# Patient Record
Sex: FEMALE | Race: WHITE | NOT HISPANIC OR LATINO | Employment: UNEMPLOYED | URBAN - METROPOLITAN AREA
[De-identification: names, ages, dates, MRNs, and addresses within clinical notes are randomized per-mention and may not be internally consistent; named-entity substitution may affect disease eponyms.]

---

## 2022-05-09 ENCOUNTER — HOSPITAL ENCOUNTER (OUTPATIENT)
Facility: MEDICAL CENTER | Age: 42
End: 2022-05-09
Attending: SPECIALIST
Payer: MEDICAID

## 2022-05-09 PROCEDURE — 87491 CHLMYD TRACH DNA AMP PROBE: CPT

## 2022-05-09 PROCEDURE — 87591 N.GONORRHOEAE DNA AMP PROB: CPT

## 2022-05-09 PROCEDURE — 87624 HPV HI-RISK TYP POOLED RSLT: CPT

## 2022-05-09 PROCEDURE — 88175 CYTOPATH C/V AUTO FLUID REDO: CPT

## 2022-05-11 LAB
C TRACH DNA GENITAL QL NAA+PROBE: NEGATIVE
CYTOLOGY REG CYTOL: NORMAL
HPV HR 12 DNA CVX QL NAA+PROBE: NEGATIVE
HPV16 DNA SPEC QL NAA+PROBE: NEGATIVE
HPV18 DNA SPEC QL NAA+PROBE: NEGATIVE
N GONORRHOEA DNA GENITAL QL NAA+PROBE: NEGATIVE
SPECIMEN SOURCE: NORMAL
SPECIMEN SOURCE: NORMAL

## 2022-05-12 ENCOUNTER — HOSPITAL ENCOUNTER (OUTPATIENT)
Facility: MEDICAL CENTER | Age: 42
End: 2022-05-12
Attending: SPECIALIST | Admitting: SPECIALIST
Payer: MEDICAID

## 2022-05-13 NOTE — H&P
DATE OF ADMISSION:  2022     REASON FOR SURGERY:  Missed .     HISTORY OF PRESENT ILLNESS:  This is a 41-year-old  1, para 0, who   initially presented with complaints of vaginal spotting and pelvic cramping.    The patient did undergo a workup, which included a transvaginal pelvic   ultrasound showing a gestational sac, no yolk sac or fetal pole, subchorionic   hemorrhage of 5 mm.  The patient subsequently began having heavy vaginal   bleeding.  She has now demonstrated an abnormal rise in serum quantitative   beta hCG studies and after counseling with persistent pelvic cramping, pain   and vaginal bleeding, the patient states that she is no longer interested in   proceeding forward with expectant or conservative management and she is not   interested in proceeding forward with medical therapy and now wished to   proceed forward with a dilation and evacuation of the uterus.  Risks, benefits   and alternatives have been addressed.  She has asked appropriate questions,   signed the appropriate consents and is wishing to proceed forward with the   surgery as planned.  Of note, the patient currently has laboratory studies   pending, pending her Rh status.     PAST MEDICAL HISTORY:  Noncontributory.     PAST SURGICAL HISTORY:  Breast augmentation surgery in , herniorrhaphy in   .     OBSTETRICAL HISTORY:  The patient is primigravida.     GYNECOLOGIC HISTORY:  The patient denies any previous sexually transmitted   disease or pelvic infections.  She does report recurrent urinary tract   infections.     FAMILY HISTORY:  Noncontributory.     REVIEW OF SYSTEMS:  Otherwise, unremarkable.     SOCIAL HISTORY:  She is .  She denies use of any alcohol, tobacco or   recreational drug use.     MEDICATIONS:  None.     ALLERGIES:  No known drug allergies.     PHYSICAL EXAMINATION:  VITAL SIGNS:  She is afebrile, hemodynamically stable.  Current vital signs   can be seen in electronic medical  record.  HEART:  Regular rate and rhythm.  CHEST:  Clear to auscultation bilaterally.  ABDOMEN:  Soft, nondistended, nontender.  Bowel sounds are present.  PELVIC:  Shows normal external female genitalia.  Small uterus with   significant tenderness to palpation at the uterine fundus, 2/4.  No adnexal   mass or tenderness to palpation were appreciated.  Bimanual exam was difficult   secondary to the patient's significant pelvic pressure and pain.  EXTREMITIES:  Nontender.     LABORATORY DATA:  Initial quantitative beta hCG study of 7160.     DIAGNOSTIC DATA:  Transvaginal pelvic ultrasound findings are as stated above.    Type in  is currently pending.     ASSESSMENT AND PLAN:  A 41-year-old  1, para 0 with a presumed   anembryonic pregnancy, now with pelvic cramping and bleeding, no longer   interested in proceeding forward with conservative or expectant management,   now interested in proceeding forward with the surgery as described above,   scheduled for 2022.        ______________________________  MD CATALINO Linton/LI/MINH    DD:  2022 17:51  DT:  2022 18:19    Job#:  [id number]

## 2022-05-16 ENCOUNTER — APPOINTMENT (OUTPATIENT)
Dept: ADMISSIONS | Facility: MEDICAL CENTER | Age: 42
End: 2022-05-16
Payer: MEDICAID

## 2022-05-16 RX ORDER — VENLAFAXINE HYDROCHLORIDE 37.5 MG/1
37.5 CAPSULE, EXTENDED RELEASE ORAL DAILY
COMMUNITY
End: 2024-01-11

## 2022-05-17 RX ORDER — SODIUM CHLORIDE, SODIUM LACTATE, POTASSIUM CHLORIDE, CALCIUM CHLORIDE 600; 310; 30; 20 MG/100ML; MG/100ML; MG/100ML; MG/100ML
INJECTION, SOLUTION INTRAVENOUS CONTINUOUS
Status: CANCELLED | OUTPATIENT
Start: 2022-05-17 | End: 2022-05-17